# Patient Record
Sex: FEMALE | Race: WHITE | NOT HISPANIC OR LATINO | Employment: UNEMPLOYED | URBAN - METROPOLITAN AREA
[De-identification: names, ages, dates, MRNs, and addresses within clinical notes are randomized per-mention and may not be internally consistent; named-entity substitution may affect disease eponyms.]

---

## 2019-07-21 ENCOUNTER — HOSPITAL ENCOUNTER (OUTPATIENT)
Facility: HOSPITAL | Age: 39
Setting detail: OBSERVATION
Discharge: HOME | End: 2019-07-22
Attending: INTERNAL MEDICINE | Admitting: INTERNAL MEDICINE

## 2019-07-21 ENCOUNTER — APPOINTMENT (OUTPATIENT)
Dept: CT IMAGING | Facility: HOSPITAL | Age: 39
End: 2019-07-21

## 2019-07-21 DIAGNOSIS — S32.058A OTHER CLOSED FRACTURE OF FIFTH LUMBAR VERTEBRA, INITIAL ENCOUNTER (HCC): ICD-10-CM
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
## 2019-07-21 DIAGNOSIS — S32.020A CLOSED WEDGE COMPRESSION FRACTURE OF SECOND LUMBAR VERTEBRA, INITIAL ENCOUNTER: Primary | ICD-10-CM

## 2019-07-21 PROBLEM — S32.009A FRACTURE OF LUMBAR SPINE (HCC): Status: ACTIVE | Noted: 2019-07-21

## 2019-07-21 LAB
ANION GAP SERPL CALCULATED.3IONS-SCNC: 14.1 MMOL/L (ref 5–15)
BASOPHILS # BLD AUTO: 0.1 10*3/MM3 (ref 0–0.2)
BASOPHILS NFR BLD AUTO: 0.6 % (ref 0–1.5)
BUN BLD-MCNC: 6 MG/DL (ref 8–20)
BUN/CREAT SERPL: 8.6 (ref 5.4–26.2)
CALCIUM SPEC-SCNC: 8.9 MG/DL (ref 8.9–10.3)
CHLORIDE SERPL-SCNC: 100 MMOL/L (ref 101–111)
CO2 SERPL-SCNC: 28 MMOL/L (ref 22–32)
CREAT BLD-MCNC: 0.7 MG/DL (ref 0.4–1)
DEPRECATED RDW RBC AUTO: 41.6 FL (ref 37–54)
EOSINOPHIL # BLD AUTO: 0.1 10*3/MM3 (ref 0–0.4)
EOSINOPHIL NFR BLD AUTO: 1.2 % (ref 0.3–6.2)
ERYTHROCYTE [DISTWIDTH] IN BLOOD BY AUTOMATED COUNT: 13.8 % (ref 12.3–15.4)
GFR SERPL CREATININE-BSD FRML MDRD: 95 ML/MIN/1.73
GLUCOSE BLD-MCNC: 214 MG/DL (ref 65–99)
HCT VFR BLD AUTO: 44.2 % (ref 34–46.6)
HGB BLD-MCNC: 14.9 G/DL (ref 12–15.9)
LYMPHOCYTES # BLD AUTO: 1.6 10*3/MM3 (ref 0.7–3.1)
LYMPHOCYTES NFR BLD AUTO: 15.9 % (ref 19.6–45.3)
MCH RBC QN AUTO: 28.9 PG (ref 26.6–33)
MCHC RBC AUTO-ENTMCNC: 33.8 G/DL (ref 31.5–35.7)
MCV RBC AUTO: 85.6 FL (ref 79–97)
MONOCYTES # BLD AUTO: 0.5 10*3/MM3 (ref 0.1–0.9)
MONOCYTES NFR BLD AUTO: 4.8 % (ref 5–12)
NEUTROPHILS # BLD AUTO: 8 10*3/MM3 (ref 1.7–7)
NEUTROPHILS NFR BLD AUTO: 77.5 % (ref 42.7–76)
NRBC BLD AUTO-RTO: 0 /100 WBC (ref 0–0.2)
PLATELET # BLD AUTO: 333 10*3/MM3 (ref 140–450)
PMV BLD AUTO: 8.2 FL (ref 6–12)
POTASSIUM BLD-SCNC: 3.1 MMOL/L (ref 3.6–5.1)
RBC # BLD AUTO: 5.17 10*6/MM3 (ref 3.77–5.28)
SODIUM BLD-SCNC: 139 MMOL/L (ref 136–144)
WBC NRBC COR # BLD: 10.3 10*3/MM3 (ref 3.4–10.8)

## 2019-07-21 PROCEDURE — 74177 CT ABD & PELVIS W/CONTRAST: CPT

## 2019-07-21 PROCEDURE — 25010000002 ONDANSETRON PER 1 MG: Performed by: PHYSICIAN ASSISTANT

## 2019-07-21 PROCEDURE — 85025 COMPLETE CBC W/AUTO DIFF WBC: CPT | Performed by: PHYSICIAN ASSISTANT

## 2019-07-21 PROCEDURE — 63710000001 INSULIN GLARGINE PER 5 UNITS: Performed by: NURSE PRACTITIONER

## 2019-07-21 PROCEDURE — 25010000002 PROMETHAZINE PER 50 MG: Performed by: NURSE PRACTITIONER

## 2019-07-21 PROCEDURE — G0378 HOSPITAL OBSERVATION PER HR: HCPCS

## 2019-07-21 PROCEDURE — 96375 TX/PRO/DX INJ NEW DRUG ADDON: CPT

## 2019-07-21 PROCEDURE — 96374 THER/PROPH/DIAG INJ IV PUSH: CPT

## 2019-07-21 PROCEDURE — 83036 HEMOGLOBIN GLYCOSYLATED A1C: CPT | Performed by: NURSE PRACTITIONER

## 2019-07-21 PROCEDURE — 25010000002 KETOROLAC TROMETHAMINE PER 15 MG: Performed by: PHYSICIAN ASSISTANT

## 2019-07-21 PROCEDURE — 0 IOPAMIDOL PER 1 ML: Performed by: PHYSICIAN ASSISTANT

## 2019-07-21 PROCEDURE — 25010000002 MORPHINE PER 10 MG: Performed by: EMERGENCY MEDICINE

## 2019-07-21 PROCEDURE — 99284 EMERGENCY DEPT VISIT MOD MDM: CPT

## 2019-07-21 PROCEDURE — 99219 PR INITIAL OBSERVATION CARE/DAY 50 MINUTES: CPT | Performed by: NURSE PRACTITIONER

## 2019-07-21 PROCEDURE — 80048 BASIC METABOLIC PNL TOTAL CA: CPT | Performed by: PHYSICIAN ASSISTANT

## 2019-07-21 RX ORDER — KETOROLAC TROMETHAMINE 30 MG/ML
30 INJECTION, SOLUTION INTRAMUSCULAR; INTRAVENOUS ONCE
Status: COMPLETED | OUTPATIENT
Start: 2019-07-21 | End: 2019-07-21

## 2019-07-21 RX ORDER — POTASSIUM CHLORIDE 1.5 G/1.77G
40 POWDER, FOR SOLUTION ORAL AS NEEDED
Status: DISCONTINUED | OUTPATIENT
Start: 2019-07-21 | End: 2019-07-22 | Stop reason: HOSPADM

## 2019-07-21 RX ORDER — POTASSIUM CHLORIDE 7.45 MG/ML
10 INJECTION INTRAVENOUS
Status: DISCONTINUED | OUTPATIENT
Start: 2019-07-21 | End: 2019-07-22 | Stop reason: HOSPADM

## 2019-07-21 RX ORDER — MORPHINE SULFATE 4 MG/ML
4 INJECTION, SOLUTION INTRAMUSCULAR; INTRAVENOUS ONCE
Status: COMPLETED | OUTPATIENT
Start: 2019-07-21 | End: 2019-07-21

## 2019-07-21 RX ORDER — HYDROCODONE BITARTRATE AND ACETAMINOPHEN 5; 325 MG/1; MG/1
1 TABLET ORAL EVERY 4 HOURS PRN
Status: DISCONTINUED | OUTPATIENT
Start: 2019-07-21 | End: 2019-07-22 | Stop reason: HOSPADM

## 2019-07-21 RX ORDER — SODIUM CHLORIDE 0.9 % (FLUSH) 0.9 %
10 SYRINGE (ML) INJECTION AS NEEDED
Status: DISCONTINUED | OUTPATIENT
Start: 2019-07-21 | End: 2019-07-22 | Stop reason: HOSPADM

## 2019-07-21 RX ORDER — LIDOCAINE 50 MG/G
2 PATCH TOPICAL ONCE
Status: COMPLETED | OUTPATIENT
Start: 2019-07-21 | End: 2019-07-22

## 2019-07-21 RX ORDER — ACETAMINOPHEN AND CODEINE PHOSPHATE 300; 30 MG/1; MG/1
1 TABLET ORAL EVERY 4 HOURS PRN
COMMUNITY
End: 2019-07-21

## 2019-07-21 RX ORDER — ONDANSETRON 2 MG/ML
4 INJECTION INTRAMUSCULAR; INTRAVENOUS ONCE
Status: COMPLETED | OUTPATIENT
Start: 2019-07-21 | End: 2019-07-21

## 2019-07-21 RX ORDER — INSULIN GLARGINE 100 [IU]/ML
90 INJECTION, SOLUTION SUBCUTANEOUS NIGHTLY
Status: DISCONTINUED | OUTPATIENT
Start: 2019-07-21 | End: 2019-07-22 | Stop reason: HOSPADM

## 2019-07-21 RX ORDER — POTASSIUM CHLORIDE 20 MEQ/1
40 TABLET, EXTENDED RELEASE ORAL AS NEEDED
Status: DISCONTINUED | OUTPATIENT
Start: 2019-07-21 | End: 2019-07-22 | Stop reason: HOSPADM

## 2019-07-21 RX ADMIN — KETOROLAC TROMETHAMINE 30 MG: 30 INJECTION, SOLUTION INTRAMUSCULAR at 20:11

## 2019-07-21 RX ADMIN — LIDOCAINE 2 PATCH: 50 PATCH CUTANEOUS at 20:15

## 2019-07-21 RX ADMIN — HYDROCODONE BITARTRATE AND ACETAMINOPHEN 1 TABLET: 5; 325 TABLET ORAL at 23:28

## 2019-07-21 RX ADMIN — MORPHINE SULFATE 4 MG: 4 INJECTION INTRAVENOUS at 19:19

## 2019-07-21 RX ADMIN — PROMETHAZINE HYDROCHLORIDE 12.5 MG: 25 INJECTION INTRAMUSCULAR; INTRAVENOUS at 23:29

## 2019-07-21 RX ADMIN — INSULIN GLARGINE 90 UNITS: 100 INJECTION, SOLUTION SUBCUTANEOUS at 23:07

## 2019-07-21 RX ADMIN — IOPAMIDOL 100 ML: 755 INJECTION, SOLUTION INTRAVENOUS at 19:57

## 2019-07-21 RX ADMIN — ONDANSETRON 4 MG: 2 INJECTION INTRAMUSCULAR; INTRAVENOUS at 19:19

## 2019-07-22 ENCOUNTER — APPOINTMENT (OUTPATIENT)
Dept: CT IMAGING | Facility: HOSPITAL | Age: 39
End: 2019-07-22

## 2019-07-22 ENCOUNTER — APPOINTMENT (OUTPATIENT)
Dept: GENERAL RADIOLOGY | Facility: HOSPITAL | Age: 39
End: 2019-07-22

## 2019-07-22 ENCOUNTER — APPOINTMENT (OUTPATIENT)
Dept: MRI IMAGING | Facility: HOSPITAL | Age: 39
End: 2019-07-22

## 2019-07-22 VITALS
OXYGEN SATURATION: 98 % | WEIGHT: 159.17 LBS | HEIGHT: 62 IN | RESPIRATION RATE: 15 BRPM | TEMPERATURE: 98.5 F | DIASTOLIC BLOOD PRESSURE: 82 MMHG | BODY MASS INDEX: 29.29 KG/M2 | HEART RATE: 72 BPM | SYSTOLIC BLOOD PRESSURE: 136 MMHG

## 2019-07-22 LAB
B-HCG UR QL: NEGATIVE
BACTERIA UR QL AUTO: ABNORMAL /HPF
BILIRUB UR QL STRIP: NEGATIVE
CLARITY UR: ABNORMAL
COLOR UR: YELLOW
GLUCOSE BLDC GLUCOMTR-MCNC: 132 MG/DL (ref 70–105)
GLUCOSE BLDC GLUCOMTR-MCNC: 39 MG/DL (ref 70–105)
GLUCOSE BLDC GLUCOMTR-MCNC: 51 MG/DL (ref 70–105)
GLUCOSE BLDC GLUCOMTR-MCNC: 65 MG/DL (ref 70–105)
GLUCOSE BLDC GLUCOMTR-MCNC: 76 MG/DL (ref 70–105)
GLUCOSE UR STRIP-MCNC: NEGATIVE MG/DL
HBA1C MFR BLD: 9 % (ref 3.5–5.6)
HGB UR QL STRIP.AUTO: NEGATIVE
HYALINE CASTS UR QL AUTO: ABNORMAL /LPF
KETONES UR QL STRIP: NEGATIVE
LEUKOCYTE ESTERASE UR QL STRIP.AUTO: ABNORMAL
NITRITE UR QL STRIP: POSITIVE
PH UR STRIP.AUTO: 6 [PH] (ref 5–8)
PROT UR QL STRIP: NEGATIVE
RBC # UR: ABNORMAL /HPF
REF LAB TEST METHOD: ABNORMAL
SP GR UR STRIP: 1.02 (ref 1–1.03)
SQUAMOUS #/AREA URNS HPF: ABNORMAL /HPF
UROBILINOGEN UR QL STRIP: ABNORMAL
WBC UR QL AUTO: ABNORMAL /HPF

## 2019-07-22 PROCEDURE — 96361 HYDRATE IV INFUSION ADD-ON: CPT

## 2019-07-22 PROCEDURE — 96376 TX/PRO/DX INJ SAME DRUG ADON: CPT

## 2019-07-22 PROCEDURE — 25010000002 MORPHINE PER 10 MG: Performed by: NURSE PRACTITIONER

## 2019-07-22 PROCEDURE — 99217 PR OBSERVATION CARE DISCHARGE MANAGEMENT: CPT | Performed by: INTERNAL MEDICINE

## 2019-07-22 PROCEDURE — 82962 GLUCOSE BLOOD TEST: CPT

## 2019-07-22 PROCEDURE — 81025 URINE PREGNANCY TEST: CPT | Performed by: NURSE PRACTITIONER

## 2019-07-22 PROCEDURE — 87088 URINE BACTERIA CULTURE: CPT | Performed by: NURSE PRACTITIONER

## 2019-07-22 PROCEDURE — 87081 CULTURE SCREEN ONLY: CPT | Performed by: ORTHOPAEDIC SURGERY

## 2019-07-22 PROCEDURE — 87086 URINE CULTURE/COLONY COUNT: CPT | Performed by: NURSE PRACTITIONER

## 2019-07-22 PROCEDURE — 81001 URINALYSIS AUTO W/SCOPE: CPT | Performed by: NURSE PRACTITIONER

## 2019-07-22 PROCEDURE — 70450 CT HEAD/BRAIN W/O DYE: CPT

## 2019-07-22 PROCEDURE — 72020 X-RAY EXAM OF SPINE 1 VIEW: CPT

## 2019-07-22 PROCEDURE — 87186 SC STD MICRODIL/AGAR DIL: CPT | Performed by: NURSE PRACTITIONER

## 2019-07-22 PROCEDURE — 99244 OFF/OP CNSLTJ NEW/EST MOD 40: CPT | Performed by: ORTHOPAEDIC SURGERY

## 2019-07-22 PROCEDURE — G0378 HOSPITAL OBSERVATION PER HR: HCPCS

## 2019-07-22 PROCEDURE — 72148 MRI LUMBAR SPINE W/O DYE: CPT

## 2019-07-22 PROCEDURE — 97161 PT EVAL LOW COMPLEX 20 MIN: CPT

## 2019-07-22 RX ORDER — KETOROLAC TROMETHAMINE 30 MG/ML
30 INJECTION, SOLUTION INTRAMUSCULAR; INTRAVENOUS EVERY 6 HOURS PRN
Status: DISCONTINUED | OUTPATIENT
Start: 2019-07-22 | End: 2019-07-22 | Stop reason: HOSPADM

## 2019-07-22 RX ORDER — MORPHINE SULFATE 4 MG/ML
4 INJECTION, SOLUTION INTRAMUSCULAR; INTRAVENOUS EVERY 4 HOURS PRN
Status: DISCONTINUED | OUTPATIENT
Start: 2019-07-22 | End: 2019-07-22 | Stop reason: HOSPADM

## 2019-07-22 RX ORDER — NICOTINE POLACRILEX 4 MG
15 LOZENGE BUCCAL
Status: DISCONTINUED | OUTPATIENT
Start: 2019-07-22 | End: 2019-07-22 | Stop reason: HOSPADM

## 2019-07-22 RX ORDER — TRAMADOL HYDROCHLORIDE 50 MG/1
50 TABLET ORAL EVERY 6 HOURS PRN
Status: DISCONTINUED | OUTPATIENT
Start: 2019-07-22 | End: 2019-07-22 | Stop reason: HOSPADM

## 2019-07-22 RX ORDER — SODIUM CHLORIDE 0.9 % (FLUSH) 0.9 %
3 SYRINGE (ML) INJECTION EVERY 12 HOURS SCHEDULED
Status: DISCONTINUED | OUTPATIENT
Start: 2019-07-22 | End: 2019-07-22 | Stop reason: HOSPADM

## 2019-07-22 RX ORDER — HYDROCODONE BITARTRATE AND ACETAMINOPHEN 7.5; 325 MG/1; MG/1
1 TABLET ORAL EVERY 4 HOURS PRN
Qty: 30 TABLET | Refills: 0 | Status: SHIPPED | OUTPATIENT
Start: 2019-07-22 | End: 2019-08-09

## 2019-07-22 RX ORDER — SODIUM CHLORIDE 9 MG/ML
100 INJECTION, SOLUTION INTRAVENOUS CONTINUOUS
Status: DISCONTINUED | OUTPATIENT
Start: 2019-07-22 | End: 2019-07-22 | Stop reason: HOSPADM

## 2019-07-22 RX ORDER — NICOTINE 21 MG/24HR
1 PATCH, TRANSDERMAL 24 HOURS TRANSDERMAL
Status: DISCONTINUED | OUTPATIENT
Start: 2019-07-22 | End: 2019-07-22 | Stop reason: HOSPADM

## 2019-07-22 RX ORDER — HYDROCODONE BITARTRATE AND ACETAMINOPHEN 7.5; 325 MG/1; MG/1
1 TABLET ORAL EVERY 6 HOURS PRN
Qty: 30 TABLET | Refills: 0 | Status: SHIPPED | OUTPATIENT
Start: 2019-07-22 | End: 2019-08-09

## 2019-07-22 RX ORDER — CYCLOBENZAPRINE HCL 10 MG
10 TABLET ORAL 3 TIMES DAILY PRN
Qty: 30 TABLET | Refills: 0 | Status: SHIPPED | OUTPATIENT
Start: 2019-07-22

## 2019-07-22 RX ORDER — DEXTROSE MONOHYDRATE 25 G/50ML
25 INJECTION, SOLUTION INTRAVENOUS
Status: DISCONTINUED | OUTPATIENT
Start: 2019-07-22 | End: 2019-07-22 | Stop reason: HOSPADM

## 2019-07-22 RX ORDER — BACLOFEN 10 MG/1
10 TABLET ORAL EVERY 12 HOURS SCHEDULED
Status: DISCONTINUED | OUTPATIENT
Start: 2019-07-22 | End: 2019-07-22 | Stop reason: HOSPADM

## 2019-07-22 RX ORDER — IBUPROFEN 600 MG/1
600 TABLET ORAL EVERY 6 HOURS PRN
Start: 2019-07-22

## 2019-07-22 RX ORDER — SODIUM CHLORIDE 0.9 % (FLUSH) 0.9 %
3-10 SYRINGE (ML) INJECTION AS NEEDED
Status: DISCONTINUED | OUTPATIENT
Start: 2019-07-22 | End: 2019-07-22 | Stop reason: HOSPADM

## 2019-07-22 RX ADMIN — SODIUM CHLORIDE 100 ML/HR: 900 INJECTION, SOLUTION INTRAVENOUS at 11:21

## 2019-07-22 RX ADMIN — POTASSIUM CHLORIDE 40 MEQ: 1500 TABLET, EXTENDED RELEASE ORAL at 06:07

## 2019-07-22 RX ADMIN — POTASSIUM CHLORIDE 40 MEQ: 1500 TABLET, EXTENDED RELEASE ORAL at 11:18

## 2019-07-22 RX ADMIN — SODIUM CHLORIDE, PRESERVATIVE FREE 3 ML: 5 INJECTION INTRAVENOUS at 11:21

## 2019-07-22 RX ADMIN — MORPHINE SULFATE 4 MG: 4 INJECTION INTRAVENOUS at 07:57

## 2019-07-22 RX ADMIN — HYDROCODONE BITARTRATE AND ACETAMINOPHEN 1 TABLET: 5; 325 TABLET ORAL at 11:18

## 2019-07-22 RX ADMIN — NICOTINE 1 PATCH: 21 PATCH TRANSDERMAL at 11:19

## 2019-07-22 RX ADMIN — HYDROCODONE BITARTRATE AND ACETAMINOPHEN 1 TABLET: 5; 325 TABLET ORAL at 15:31

## 2019-07-22 RX ADMIN — BACLOFEN 10 MG: 10 TABLET ORAL at 15:31

## 2019-07-22 RX ADMIN — SODIUM CHLORIDE 100 ML/HR: 900 INJECTION, SOLUTION INTRAVENOUS at 07:56

## 2019-07-23 ENCOUNTER — READMISSION MANAGEMENT (OUTPATIENT)
Dept: CALL CENTER | Facility: HOSPITAL | Age: 39
End: 2019-07-23

## 2019-07-23 LAB — MRSA SPEC QL CULT: NORMAL

## 2019-07-24 ENCOUNTER — READMISSION MANAGEMENT (OUTPATIENT)
Dept: CALL CENTER | Facility: HOSPITAL | Age: 39
End: 2019-07-24

## 2019-07-24 LAB — BACTERIA SPEC AEROBE CULT: ABNORMAL

## 2019-07-24 NOTE — OUTREACH NOTE
Prep Survey      Responses   Facility patient discharged from?  [facility]   Is patient eligible?  Yes   Discharge diagnosis  Fracture of lumbar spine ([facility] TLSO bracing)   Does the patient have one of the following disease processes/diagnoses(primary or secondary)?  Other   Does the patient have Home health ordered?  No   Is there a DME ordered?  No   Is this a private patient?  Yes   Prep survey completed?  Yes          Mildred Lacey RN

## 2019-07-24 NOTE — OUTREACH NOTE
Medical Week 1 Survey      Responses   Facility patient discharged from?  [facility]   Does the patient have one of the following disease processes/diagnoses(primary or secondary)?  Other   Is there a successful TCM telephone encounter documented?  No   Week 1 attempt successful?  No   Rescheduled  Revoked   Revoke  Phone number issues [voicemail of person was named Flavio.  No voicemail left]          Leyla Pollock LPN

## 2019-07-28 ENCOUNTER — TELEPHONE (OUTPATIENT)
Dept: INTERNAL MEDICINE | Facility: HOSPITAL | Age: 39
End: 2019-07-28

## 2019-08-09 ENCOUNTER — TELEPHONE (OUTPATIENT)
Dept: ORTHOPEDIC SURGERY | Facility: CLINIC | Age: 39
End: 2019-08-09

## 2019-08-09 ENCOUNTER — OFFICE VISIT (OUTPATIENT)
Dept: ORTHOPEDIC SURGERY | Facility: CLINIC | Age: 39
End: 2019-08-09

## 2019-08-09 VITALS
DIASTOLIC BLOOD PRESSURE: 85 MMHG | BODY MASS INDEX: 28.16 KG/M2 | HEIGHT: 62 IN | WEIGHT: 153 LBS | HEART RATE: 103 BPM | SYSTOLIC BLOOD PRESSURE: 127 MMHG

## 2019-08-09 DIAGNOSIS — S32.9XXA CLOSED NONDISPLACED FRACTURE OF PELVIS, UNSPECIFIED PART OF PELVIS, INITIAL ENCOUNTER (HCC): ICD-10-CM

## 2019-08-09 DIAGNOSIS — M41.125 ADOLESCENT IDIOPATHIC SCOLIOSIS OF THORACOLUMBAR REGION: ICD-10-CM

## 2019-08-09 DIAGNOSIS — M54.5 ACUTE LOW BACK PAIN, UNSPECIFIED BACK PAIN LATERALITY, WITH SCIATICA PRESENCE UNSPECIFIED: Primary | ICD-10-CM

## 2019-08-09 DIAGNOSIS — M47.27 LUMBOSACRAL RADICULOPATHY DUE TO DEGENERATIVE JOINT DISEASE OF SPINE: ICD-10-CM

## 2019-08-09 DIAGNOSIS — S32.020A CLOSED COMPRESSION FRACTURE OF SECOND LUMBAR VERTEBRA, INITIAL ENCOUNTER: ICD-10-CM

## 2019-08-09 PROBLEM — M54.50 ACUTE LOW BACK PAIN: Status: ACTIVE | Noted: 2019-08-09

## 2019-08-09 PROCEDURE — 99203 OFFICE O/P NEW LOW 30 MIN: CPT | Performed by: PHYSICIAN ASSISTANT

## 2019-08-09 RX ORDER — METHYLPREDNISOLONE 4 MG/1
21 TABLET ORAL DAILY
Qty: 21 TABLET | Refills: 0 | Status: SHIPPED | OUTPATIENT
Start: 2019-08-09

## 2019-08-09 RX ORDER — OXYCODONE HYDROCHLORIDE AND ACETAMINOPHEN 5; 325 MG/1; MG/1
1 TABLET ORAL EVERY 4 HOURS PRN
Qty: 30 TABLET | Refills: 0 | Status: SHIPPED | OUTPATIENT
Start: 2019-08-09

## 2019-08-09 RX ORDER — ONDANSETRON 4 MG/1
4 TABLET, FILM COATED ORAL EVERY 8 HOURS PRN
Qty: 30 TABLET | Refills: 0 | Status: SHIPPED | OUTPATIENT
Start: 2019-08-09

## 2019-08-09 RX ORDER — BACLOFEN 10 MG/1
10 TABLET ORAL 3 TIMES DAILY
Qty: 60 TABLET | Refills: 0 | Status: SHIPPED | OUTPATIENT
Start: 2019-08-09

## 2019-08-09 RX ORDER — OXYCODONE HYDROCHLORIDE AND ACETAMINOPHEN 5; 325 MG/1; MG/1
1 TABLET ORAL EVERY 4 HOURS PRN
Qty: 30 TABLET | Refills: 0 | Status: SHIPPED | OUTPATIENT
Start: 2019-08-09 | End: 2019-08-09 | Stop reason: SDUPTHER

## 2019-08-09 RX ORDER — GABAPENTIN 300 MG/1
CAPSULE ORAL
Qty: 60 CAPSULE | Refills: 0 | Status: SHIPPED | OUTPATIENT
Start: 2019-08-09

## 2019-08-09 NOTE — PROGRESS NOTES
[facility] New patient    Patient Name: Jewels Douglas    History of Present Illness:  Jewels Douglas is a 39 y.o. year old female here today with chief complaint of had concerns including Follow-up and Pain of the Lumbar Spine and Consult (NP ER follow up on comp fx)..  This patient drove up from [place] today to be seen for a L2 compression fracture.  She was thrown off a horse Apt. 2 weeks ago in this area and was rushed to the emergency department she was diagnosed at L2 fracture and that her brace and sent home.  Also given prescription for Norco and states to help.  Her axial back pain is actually improved significantly since that ER visit, but she continues to have severe bilateral buttock pain right worse than left.  No radiation further down her leg.  Pain is worse with sitting unremarkable ambulating.  Is never had any similar problems.  Pain is intractable.    Imaging: Reviewed MRI of lumbar spine which demonstrates acute fracture of L2 right side anterior wedging of vertebral body of L2, no bony retropulsion no disruption posterior elements at that level.  She has some levoscoliosis which appears idiopathic.  I see no evidence of pelvic fracture but I can see her superior aspect of the sacral ala.  Also reviewed a CT abdomen pelvis which shows the L2 compression, no displaced fracture of pelvis. she has previously L5-S1 disc degeneration with a small 5 S1 disc herniation without stenosis.    Impression:   1. Acute low back pain, unspecified back pain laterality, with sciatica presence unspecified    2. Closed compression fracture of second lumbar vertebra, initial encounter (CMS/[facility])    3. Lumbosacral radiculopathy due to degenerative joint disease of spine    4. Adolescent idiopathic scoliosis of thoracolumbar region    5. Closed nondisplaced fracture of pelvis, unspecified part of pelvis, initial encounter (CMS/[facility])         Plan: Continue to wear TLSO.  Because she is from over an hour  away from here my plan is to refer her to a specialist in her area, Dr. Saenz.  My concern is for pelvic fracture versus sacral iliac dysfunction at this time peer I will also order MRI of pelvis.  I advised that she take all of her images to her appointment with dr saenz.   Neurontin, Atrovent, Medrol, oxycodone for pain control.    Vitals:  Vitals:    19 0938   BP: 127/85   Pulse: 103       Patient's Family and Social History:  History reviewed. No pertinent family history.  Social History     Socioeconomic History   • Marital status:      Spouse name: Not on file   • Number of children: Not on file   • Years of education: Not on file   • Highest education level: Not on file   Tobacco Use   • Smoking status: Current Every Day Smoker     Packs/day: 1.00     Types: Cigarettes   • Smokeless tobacco: Never Used   Substance and Sexual Activity   • Alcohol use: Yes     Comment: occasional   • Drug use: No   • Sexual activity: Defer       Patients Medical and Surgical History:  Past Medical History:   Diagnosis Date   • Diabetes mellitus (CMS/HCC)    • Disease of thyroid gland    • Scoliosis      Past Surgical History:   Procedure Laterality Date   •  SECTION     • CHOLECYSTECTOMY         Review of Systems   Constitutional: Negative for activity change, appetite change and fatigue.   HENT: Negative for congestion.    Eyes: Negative for visual disturbance.   Respiratory: Negative for shortness of breath.    Gastrointestinal: Negative for abdominal pain.   Genitourinary: Negative for flank pain.   Musculoskeletal: Positive for arthralgias and back pain.   Skin: Negative for wound.   Neurological: Negative for headaches.   Psychiatric/Behavioral: Negative for behavioral problems. The patient is not nervous/anxious.        Physical Exam   Constitutional: She is oriented to person, place, and time. She appears well-developed.   HENT:   Head: Normocephalic and atraumatic.   Eyes: Conjunctivae are normal.    Neck: Normal range of motion.   Cardiovascular: Normal rate.   Pulmonary/Chest: Effort normal.   Abdominal: There is no guarding.   Musculoskeletal: She exhibits tenderness.        Right hip: Normal.        Left hip: Normal.        Lumbar back: She exhibits decreased range of motion, tenderness and pain.        Arms:  Neurological: She is oriented to person, place, and time.   Skin: Skin is warm.   Psychiatric: Her behavior is normal.   Vitals reviewed.    Back Exam     Tenderness   The patient is experiencing tenderness in the sacroiliac.    Comments:  Severe pain with compression of pelvis, severe pain with palpation over both sacral iliac joint.            Orders Placed This Encounter   Procedures   • XR Spine Lumbar 1 View     Scheduling Instructions:      rm 19      LSpine lat      Np - LBP following comp fx L2 (fall from horse) 2 weeks ago      9:45     Order Specific Question:   Reason for Exam:     Answer:   low back pain     Order Specific Question:   Patient Pregnant     Answer:   No   • MRI Pelvis Without Contrast     Standing Status:   Future     Standing Expiration Date:   8/9/2020     Scheduling Instructions:      [facility]     Order Specific Question:   Patient Pregnant     Answer:   Unknown   • Ambulatory Referral to Neurosurgery     Referral Priority:   Routine     Referral Type:   Consultation     Referral Reason:   Specialty Services Required     Referred to Provider:   Ruiz Saenz MD     Requested Specialty:   Neurosurgery     Number of Visits Requested:   1

## 2021-02-23 ENCOUNTER — HOSPITAL ENCOUNTER (OUTPATIENT)
Dept: DIABETES SERVICES | Facility: HOSPITAL | Age: 41
Discharge: HOME | End: 2021-02-23
Attending: NURSE PRACTITIONER

## 2021-02-23 ENCOUNTER — OFFICE VISIT CONVERTED (OUTPATIENT)
Dept: DIABETES SERVICES | Facility: HOSPITAL | Age: 41
End: 2021-02-23
Attending: NURSE PRACTITIONER

## 2021-02-23 LAB — GLUCOSE BLD-MCNC: 353 MG/DL (ref 65–99)

## 2021-04-02 ENCOUNTER — HOSPITAL ENCOUNTER (OUTPATIENT)
Dept: DIABETES SERVICES | Facility: HOSPITAL | Age: 41
Discharge: HOME | End: 2021-04-02
Attending: NURSE PRACTITIONER

## 2021-04-02 ENCOUNTER — OFFICE VISIT CONVERTED (OUTPATIENT)
Dept: DIABETES SERVICES | Facility: HOSPITAL | Age: 41
End: 2021-04-02
Attending: NURSE PRACTITIONER

## 2021-04-02 LAB — GLUCOSE BLD-MCNC: 419 MG/DL (ref 65–99)

## 2021-04-13 ENCOUNTER — OFFICE VISIT CONVERTED (OUTPATIENT)
Dept: GASTROENTEROLOGY | Facility: CLINIC | Age: 41
End: 2021-04-13
Attending: NURSE PRACTITIONER

## 2021-04-13 ENCOUNTER — CONVERSION ENCOUNTER (OUTPATIENT)
Dept: GASTROENTEROLOGY | Facility: CLINIC | Age: 41
End: 2021-04-13

## 2021-05-11 NOTE — H&P
History and Physical      Patient Name: Jewels Douglas   Patient ID: [id number]   Sex: Female   YOB: 1980    Primary Care Provider: Nikole RAMOS   Referring Provider: Nikole RAMOS    Visit Date: April 13, 2021    Provider: RICHARD Macias   Location: [facility]   Location Address: [address] [place] [address], [address]  [place], [place]  [id number]   Location Phone: [phone]          Chief Complaint  · Nausea  · Vomiting      History Of Present Illness  The patient is a 40 year old female who presents on referral from Nikole RAMOS for a gastroenterology evaluation.      She presents for evaluation of nausea and vomiting.      Vomiting is occurring 2-3 times per week.  States that she's now eating very tiny portions.  If she overeats, will have vomiting.  States that she feels like she can't eat because she becomes full quickly.  This has been present for about 2 months.  Admits constant heartburn.  States that she often can't lie down due to reflux.  Tried TUMS, MOM, pepto, Nexium and Prilosec with no improvement.      Denies previous EGD.      She admits constipation and has a bowel movement about once per week.      PMH:  IDDM since pregnancy in 2006.      40-50 lb. weight loss in the past 6 months.  She is gaining some weight back b/c she's figured out what she can eat.            Medication List  Basaglar KwikPen U-100 Insulin 100 unit/mL (3 mL) subcutaneous insulin pen; Humalog KwikPen Insulin 100 unit/mL subcutaneous insulin pen         Allergy List  NO KNOWN DRUG ALLERGIES       Allergies Reconciled  Family Medical History  Colon Cancer         Social History  Alcohol (Current - status unknown); Tobacco (Current every day)         Review of Systems  · Constitutional  o Denies  o : chills, fever  · Eyes  o Denies  o : blurred vision, changes in vision  · Cardiovascular  o Denies  o : chest pain, irregular heart beats  · Respiratory  o Denies  o :  "shortness of breath, cough  · Gastrointestinal  o Admits  o : See HPI  · Genitourinary  o Denies  o : dysuria, blood in urine  · Integument  o Denies  o : rash, new skin lesions  · Neurologic  o Denies  o : tingling or numbness, seizures  · Musculoskeletal  o Denies  o : joint pain, joint swelling  · Endocrine  o Admits  o : weight loss  o Denies  o : weight gain  · Psychiatric  o Denies  o : anxiety, depression      Vitals  Date Time BP Position Site L\R Cuff Size HR RR TEMP (F) WT  HT  BMI kg/m2 BSA m2 O2 Sat FR L/min FiO2 HC       04/13/2021 03:04 /76 Sitting    79 - R  97.3 149lbs 2oz 5'  6\" 24.07 1.77             Physical Examination  · Constitutional  o Appearance  o : well developed, well-nourished, in no acute distress  · Eyes  o Vision  o :   § Visual Fields  § : eyes move symmetrical in all directions  o Sclerae  o : anicteric  o Pupils and Irises  o : pupils equal and symmetrical  · Neck  o Inspection/Palpation  o : supple  · Respiratory  o Respiratory Effort  o : breathing unlabored  o Inspection of Chest  o : normal appearance, no retractions  o Auscultation of Lungs  o : clear to auscultation bilaterally  · Cardiovascular  o Heart  o :   § Auscultation of Heart  § : no murmurs, gallops or rubs  · Gastrointestinal  o Abdominal Examination  o : soft, nontender to palpation, with normal active bowel sounds, no appreciable hepatosplenomegaly  o Digital Rectal Exam  o : deferred  · Lymphatic  o Neck  o : no palpable lymphadenopathy  · Skin and Subcutaneous Tissue  o General Inspection  o : without focal lesions; turgor is normal  · Psychiatric  o General  o : Alert and oriented x3  o Mood and Affect  o : Mood and affect are appropriate to circumstances  · Extremities  o Extremities  o : No edema, no cyanosis          Assessment  · Pre-op testing     V72.84/Z01.818  · Abdominal bloating     787.3/R14.0  · Constipation     564.00/K59.00  · GERD (gastroesophageal reflux " disease)     530.81/K21.9  · Heartburn     787.1/R12  · Nausea and vomiting     787.01/R11.2  · Weight loss     783.21/R63.4      Plan  · Orders  o [facility] Pre-Op Covid-19 Screening (20081) - - 2021  o Consent for Esophagogastroduodenoscopy (EGD) - Possible risks/complications, benefits, and alternatives to surgical or invasive procedure have been explained to patient and/or legal guardian. - Patient has been evaluated and can tolerate anesthesia and/or sedation. Risks, benefits, and alternatives to anesthesia and sedation have been explained to patient and/or legal guardian. (96643) - - 2021  · Medications  o Miralax 17 gram/dose oral powder   SI capful mixed with 8 oz liquid by mouth daily   DISP: (1) Canister with 5 refills  Prescribed on 2021     o Protonix 40 mg oral tablet,delayed release (DR/EC)   SIG: take 1 tablet (40 mg) by oral route once daily for 30 days   DISP: (30) Tablet with 3 refills  Prescribed on 2021     o Medications have been Reconciled  o Transition of Care or Provider Policy  · Instructions  o Handouts provided: Pre-procedure instructions including date and time and location of procedure.  o PLAN: Proceed with procedure. Patient understands risks and benefits and is willing to proceed. Understands the risks include, but are not limited to, bleeding and/or perforation.  o Information given on current diagnoses. If EGD normal, will need GES. Instructed to follow gastroparesis diet for symptom relief - handout given.  o Electronically Identified Patient Education Materials Provided Electronically  · Disposition  o Follow Up after procedure            Electronically Signed by: RICHARD Macias -Author on 2021 04:05:43 PM

## 2021-05-14 VITALS
DIASTOLIC BLOOD PRESSURE: 76 MMHG | BODY MASS INDEX: 23.97 KG/M2 | WEIGHT: 149.12 LBS | HEART RATE: 79 BPM | HEIGHT: 66 IN | TEMPERATURE: 97.3 F | SYSTOLIC BLOOD PRESSURE: 115 MMHG

## 2021-05-22 ENCOUNTER — TRANSCRIBE ORDERS (OUTPATIENT)
Dept: LAB | Facility: HOSPITAL | Age: 41
End: 2021-05-22

## 2021-05-22 DIAGNOSIS — Z01.818 PRE-OP TESTING: Primary | ICD-10-CM

## 2021-05-28 VITALS
DIASTOLIC BLOOD PRESSURE: 70 MMHG | OXYGEN SATURATION: 96 % | SYSTOLIC BLOOD PRESSURE: 130 MMHG | RESPIRATION RATE: 18 BRPM | WEIGHT: 148.15 LBS | TEMPERATURE: 98.9 F | BODY MASS INDEX: 27.26 KG/M2 | DIASTOLIC BLOOD PRESSURE: 60 MMHG | SYSTOLIC BLOOD PRESSURE: 110 MMHG | RESPIRATION RATE: 16 BRPM | HEIGHT: 62 IN | BODY MASS INDEX: 28.03 KG/M2 | TEMPERATURE: 98.3 F | HEART RATE: 86 BPM | HEIGHT: 62 IN | HEART RATE: 79 BPM | OXYGEN SATURATION: 95 % | WEIGHT: 152.34 LBS

## 2021-05-28 NOTE — PROGRESS NOTES
Patient: EDUIN LEDESMA     Acct: [id number]     Report: #[id number]  UNIT #: [id number]     : 1980    Encounter Date:2021  PRIMARY CARE: ROSELINE JACOBO  ***Elvira***  --------------------------------------------------------------------------------------------------------------------  Date of Encounter      2021            Chief Complaint      DIABETES MELLITUS TYPE I            Referring Providers/Copies To      Referring Provider:  ROSELINE JACOBO      Copies To:   ROSELINE JACOBO            Allergies      Coded Allergies:             No Known Drug Allergies (Verified  Allergy, 21)            Medications            medroxyPROGESTERone ACETATE (Depo-Provera) 150 Mg/1 Ml Vial      150 MG IM ONCE, #1 ML         Reported         21       Insulin Lispro (HumaLOG KWIKPEN 100 UNITS/ML) 100 Units/Ml Insuln.pen      20 UNITS SUBQ QID INSULIN, #1 BOX 0 Refills         Reported         21       Insulin Glargine,Hum.rec.anlog (Basaglar Kwikpen U-100) 100 Unit/1 Ml Insuln.pen      40 UNITS SUBQ QDAY, #1 INSULN.PEN         Reported         21            Vital Signs      Height 5 ft 2 in / 157.48 cm      Weight 148 lbs 2.386 oz / 67.2 kg      BSA 1.68 m2      BMI 27.1 kg/m2      Temperature 98.9 F / 37.17 C - Temporal      Pulse 79      Respirations 18      Blood Pressure 110/70 Sitting, Left Arm      Pulse Oximetry 95%, room air      Blood Glucose Value:  353 (Finger Stick)            Eye Exam      When was your last eye exam?:              Where was it done?:        unknown            Pain Score      Experiencing any pain?:  No            Preventative      Hx Influenza Vaccination:  No      Influenza Vaccine Declined:  Yes      Have You Had 2 or More Falls i:  No      Have You Had a Fall with an In:  No      Hx Pneumococcal Vaccination:  No      Encouraged to follow-up with:  PCP regarding preventative exams.      Chart initiated by:      Pearl Monte MA            HPI - Diabetes  "     This patient is seen in the office today for evaluation for diabetes medication     management and possible insulin pump therapy.  She is a 40-year-old female     patient with a 15-year history of type 1 diabetes.  She is accompanied by her     significant other.  Her diabetes is complicated by neuropathy and symptoms that     resemble gastroparesis but she has not been diagnosed at this time.  The patient    has had some frequent hospital admissions for DKA over the last 6 months.  She     denies missing doses of insulin except on a rare occasion.  She states her     glucose levels will become markedly elevated despite taking the insulin.  She     states her glucose levels are \"all over the place\".  She is currently managed     using Basaglar 40 units in the morning and she uses Humalog 20 to 40 units based    on a scale, but she is unable to give me the exact directions of her scale.  She    states she has never been seen before by endocrinology or diabetes services and     is hoping to establish care in this practice today.  Her glucose level upon     arrival to today's visit was 353 mg/dL.            The patient is very concerned about her GI distress.  She states that she is     struggling to keep food down on most occasions.  She states that she vomits     probably 7 out of 10 times that she eats.  She states that she eats small georgina    tities of food but feels like she has consumed excessive quantities.  The     symptoms do represent possible gastroparesis.  Giving her 15-year history of     type 1 diabetes and her other neuropathy symptoms this may be the case.  She     also states that she has reflux if she lies flat in the bed within a couple     hours of eating.  We discussed the patient's dietary behaviors that may be contr    ibuting to some of her GI distress, such as large meals versus small meals.  Her    gastroparesis may also be contributing to her fluctuating glucose levels as she     is " absorbing carbohydrates at varying rates.            Most Recent Lab Findings      Most Recent HGA1C      Most current A1c was collected on 2021 was 10.4%.  A copy of these     labs have been scanned into her record.      A C-peptide level was collected on 2021 and was less than 0.1 ng/mL     indicating type 1 diabetes      Diabetes Type:  Type 1      Diabetes Complications:  Gastroparesis (The patient indicates she has feelings     of excessive bloating and nausea with almost every meal even though she is     eating only small quantities of food; this may represent gastroparesis although     she has not been diagnosed), Neuropathy (She complains of some tingling in her     feet and fingertips)      Number of Years?:  15      Hospitalizations 2nd to DM?:  Yes (DKA x 3 in  and once in 2021)      Dietary Habits:  2 meals daily      Frequency:  Times per day (4-5)      Blood Glucose Range:        40 - 500; she indicates sometimes her meter just says high            PAST,FAMILY,   Past Medical History      Past Medical History:  Anxiety, Depression, GERD, Thyroid Disease (hypothyroid)      Other Medical History      Urinary Tract Infections; Yeast Infections            Past Surgical History      Past Surgical History:   Section, Cholecystectomy            Past Family History      TYPE 2 DM (Mother; Both Sets Grandparents)            Social History      Marrital Status:        Lives independently:  No      Number of Children:  3      Occupation:  Stay at home mom            Tobacco Use      Smoking status:  Current every day smoker      Smoking packs/day:  1      Smoking history:  10-25 pack years      Quit status:  Considering quitting (She indicates she would like to quit smoking    however she feels like she is too stressed at this time to consider it)            Vaping      Currently Vaping:  No            Alcohol Use      None            Substance Use      Substance  Use:  Denies Use            Review of Sytems      General:  Appetite Changes (poor appetite); No Fatigue; Weight Loss (40lbs. over    the last year or 2); No Weight Gain, No Weakness      Eyes:  Blurred Vision; No Corrective Lenses, No Vision Changes, No Vision Loss      Cardiovascular:  Chest Pain; No Palpitations, No Peripheral edema      Gastrointestinal:  Constipation (She reports episodes of constipation     alternating with diarrhea), Diarrhea, Nausea/Vomiting (She estimates that she     has vomiting with 7 out of 10 meals)      Integumentary:  No Lesions, No Rash, No Wounds      Neurologic:  No Extremity Pain; Numbness (Hands and Feet ), Tingling (Hands and     Feet), Headache, Dizziness      Psychiatric:  Anxiety, Depression, Stress      Endocrine:  Hypoglycemia (She reports episodes of hypoglycemia at least 3 times     a week sometimes as low as 40 mg/dL); No Hypo Unawareness; Nocturnal Hypo (3-4     times a week; awakens feeling very weak); No Polyuria; Polyphagia, Polydipsia      ENT:  No Hearing loss, No Sinusitis, No Difficulty swallowing      Respiratory:  No Shortness of breath, No Wheezing, No Cough      Genitourinary:  No UTI, No Vaginal yeast infections; Difficulty urinating; No     Incontinence      Musculoskeletal:  No Joint pain, No Muscle pain; Back pain            Exam      Constitutional:  Awake and Alert, Appropriately dressed/groomed; Not Acutely     Distressed      Eyes:  No Scleral Icterus; Intact EOM      Cardiovascular:  No Peripheral Edema; Normal Chest Appearance      Psychiatric:  Appropriate Affect, Intact Judgement, Oriented x 3,       ENMT:  Nose/ears normal, Normal hearing      Extremities:  No Cyanosis, No Edema; Normal temperature; No Deformity            Impression      Type 1 diabetes with diabetic neuropathy and possible gastroparesis, anxiety,     depression, GERD, hypothyroid            Diagnosis      Type 1 diabetes mellitus with hyperglycemia - E10.65            Notes       New Medications      * Glucagon (Baqsimi) 3 MG SPRAY: 3 MG NAREONE AS DIRECTED #2         Instructions: Administer for severe HYPOglycemia - see package insert.         Dx: Type 1 diabetes mellitus with hyperglycemia - E10.65      * Blood-Glucose Meter,Continuous (Dexcom G6 ) 1 EACH EACH: EACH XX USE       AS DIRECTED #1      * Blood-Glucose Sensor (Dexcom G6 Sensor) 1 EACH EACH: EACH XX USE AS DIRECTED       #3         Instructions: Change every 10 days.      * Blood-Glucose Transmitter (Dexcom G6 Transmitter) 1 EACH EACH: EACH XX USE AS       DIRECTED #1         Instructions: Change every 3 months.      New Office Procedures      * POINT OF CARE BG, Routine         Dx: Type 1 diabetes mellitus with hyperglycemia - E10.65            Plan      The patient was instructed to increase her Basaglar to 45 units each day given     the significant hyperglycemia she is currently having.  We discussed a variety     of potential causes of her hypoglycemia including over correcting with the rapid    acting insulin for meals and glucose levels.  She was instructed to reduce her     mealtime doses of insulin to the lower end of her existing scale.  She is asked     to bring a copy of that scale to her follow-up appointment.              As mentioned previously the patient is interested in insulin pump therapy.  We     will submit documents to the insurance for approval of the pump as well as a     continuous glucose sensor.  Given the patient's frequent episodes of hypogly    cemia, she is an appropriate candidate for continuous glucose sensor therapy.      In the meantime she is to monitor her glucose levels 4 times a day at a minimum     and is asked to record them for review at a follow-up appointment.  She will     contact our office upon receipt of the continuous glucose sensor or the insulin     pump for training on the devices.              She is to call our office if she continues to struggle with frequent  episodes of    hypoglycemia or glucose levels remain consistently above 300 mg/dL.  A prescri    ption for Baqsimi was sent to her pharmacy for treatment of severe hypoglycemia.     The patient and her significant other were instructed on the use of the device.            In regards to her GI distress it was recommended she eat multiple small meals     throughout the day rather than larger meals.it was also suggested she do a 7 to     14-day trial of over-the-counter Prilosec to see if some of her symptoms may be     related to reflux.  Because of the patient's persistent gastrointestinal issues     including bloating, nausea, and frequent vomiting we will make a referral to     gastroenterology for evaluation for GERD versus gastroparesis.            Pain Plan      Pain Zero Today            Patient Education      Patient Education Provided:  Yes            Topics Patient Counseled on      Topics Patient Counseled on:  Meds, Monitoring, Diet, Hypo Prevention            Tobacco Counseling      Tobacco Cessation Counseling:  for under 3 minutes            Electronically signed by AFSANEH REESE  02/24/2021 19:24       Disclaimer: Converted document may not contain table formatting or lab diagrams. Please see [employer] for the authenticated document.

## 2021-05-28 NOTE — PROGRESS NOTES
Patient: EDUIN LEDESMA     Acct: [id number]     Report: #[id number]  UNIT #: [id number]     : 1980    Encounter Date:2021  PRIMARY CARE: ROSELINE JACOBO  ***Elvira***  --------------------------------------------------------------------------------------------------------------------  Date of Encounter      2021            Chief Complaint      DIABETES MELLITUS TYPE I            Referring Providers/Copies To      Referring Provider:  ROSELINE JACOBO      Copies To:   ROSELINE JACOBO            Allergies      Coded Allergies:             NO KNOWN DRUG ALLERGIES (Verified  Allergy, Unknown, 21)            Medications      Last Reconciled on 21 8:01 pm by AFSANEH REESE      Glucagon (Baqsimi) 3 Mg Spray      3 MG NAREONE AS DIRECTED, #2 BOTTLE 3 Refills         Prov: AFSANEH REESE         21       medroxyPROGESTERone ACETATE (Depo-Provera) 150 Mg/1 Ml Vial      150 MG IM ONCE, #1 ML         Reported         21       Insulin Lispro (HumaLOG KWIKPEN 100 UNITS/ML) 100 Units/Ml Insuln.pen      20 UNITS SUBQ QID INSULIN, #1 BOX 0 Refills         Reported         21       Insulin Glargine,Hum.rec.anlog (Basaglar Kwikpen U-100) 100 Unit/1 Ml Insuln.pen      40 UNITS SUBQ QDAY, #1 INSULN.PEN         Reported         21            Vital Signs      Height 5 ft 2 in / 157.48 cm      Weight 152 lbs 5.406 oz / 69.1 kg      BSA 1.70 m2      BMI 27.9 kg/m2      Temperature 98.3 F / 36.83 C - Temporal      Pulse 86      Respirations 16      Blood Pressure 130/60 Sitting, Left Arm      Pulse Oximetry 96%, room air      Blood Glucose Value:  419 (Finger Stick)            Eye Exam      When was your last eye exam?:              Where was it done?:        unknown            Pain Score      Experiencing any pain?:  No            Preventative      Hx Influenza Vaccination:  No      Influenza Vaccine Declined:  Yes      Have You Had 2 or More Falls i:  Yes      Have You Had a Fall with  an In:  No      Hx Pneumococcal Vaccination:  No      Encouraged to follow-up with:  PCP regarding preventative exams.      Chart initiated by:      Pearl Monte MA            HPI - Diabetes      This patient is seen in the office today for follow-up evaluation for diabetes     medication management.  She is a 40-year-old female patient with a history of     type 1 diabetes complicated by gastroparesis and diabetic neuropathy.  She     denies any significant changes with these conditions.  The patient is currently     managed using Basaglar 46 units every day and Humalog 20 units with each meal     however she struggles with remembering to take this insulin and misses it     frequently.  She states she is often very busy or away from home and forgets to     take the insulin with her.  The patient was prescribed a Dexcom continuous     glucose sensor to assist in the management of her care however it is too costly     for her at this time.  She was also prescribed a tandem insulin pump however she    is still uncertain if she wants to use a pump or not.  The patient was also re    ferred to gastroenterology for evaluation for possible gastroparesis however she    was unable to make that appointment and has been rescheduled.  She states her     glucose levels have been widely fluctuating but she has not had any episodes of     hypoglycemia recently.            Her glucose level upon arrival to the appointment today was 419 mg/dL.  She     states that he recently ate lunch and she did not take any insulin for her     lunch.            Most Recent Lab Findings      Most Recent HGA1C      Her most current A1c was collected on February 5, 2020 and was 10.4% indicating     uncontrolled type 1 diabetes.      Diabetes Type:  Type 1      Diabetes Complications:  Gastroparesis (The patient indicates she has feelings     of excessive bloating and nausea with almost every meal even though she is     eating only small quantities  of food; this may represent gastroparesis although     she has not been diagnosed), Neuropathy (She complains of some tingling in her     feet and fingertips)      Number of Years?:  15      Hospitalizations 2nd to DM?:  Yes (DKA x 3 in  and once in 2021)      Dietary Habits:  2 meals daily      Frequency:  Times per day (2-3)      Blood Glucose Range:        150 - 'high' at times            PAST,FAMILY,   Past Medical History      Past Medical History:  Anxiety, Depression, GERD, Thyroid Disease (hypothyroid)      Other Medical History      Urinary Tract Infections; Yeast Infections            Past Surgical History      Past Surgical History:   Section, Cholecystectomy            Past Family History      TYPE 2 DM (Mother; Both Sets Grandparents)            Social History      Marrital Status:        Lives independently:  No      Number of Children:  3      Occupation:  Stay at home mom            Tobacco Use      Smoking status:  Current every day smoker      Smoking packs/day:  1      Smoking history:  10-25 pack years      Quit status:  Considering quitting (She indicates she would like to quit smoking    however she feels like she is too stressed at this time to consider it)            Vaping      Currently Vaping:  No            Alcohol Use      None            Substance Use      Substance Use:  Denies Use            Review of Sytems      General:  No Appetite Changes; Fatigue; No Weight Loss; Weight Gain (She has had    a 4 pound weight gain since her last appointment), Weakness      Eyes:  Blurred Vision; No Corrective Lenses, No Vision Changes, No Vision Loss      Cardiovascular:  No Chest Pain, No Palpitations, No Peripheral edema      Gastrointestinal:  No Constipation, No Diarrhea, No Nausea/Vomiting      Integumentary:  No Lesions, No Rash, No Wounds      Neurologic:  No Extremity Pain, No Numbness, No Tingling; Headache (Sometimes),     Dizziness (Sometimes)      Psychiatric:   No Anxiety, No Depression; Stress      Endocrine:  No Hypoglycemia, No Hypo Unawareness, No Nocturnal Hypo; Polyuria;     No Polyphagia, No Polydipsia      ENT:  No Hearing loss, No Sinusitis, No Difficulty swallowing      Respiratory:  No Shortness of breath, No Wheezing, No Cough      Genitourinary:  No UTI, No Vaginal yeast infections, No Difficulty urinating, No    Incontinence      Musculoskeletal:  No Joint pain, No Muscle pain; Back pain            Exam      Constitutional:  Awake and Alert, Appropriately dressed/groomed; Not Acutely     Distressed      Eyes:  No Scleral Icterus; Intact EOM; No Eyelid swelling      Cardiovascular:  No Peripheral Edema; Normal Chest Appearance      Musculoskeletal:  Steady Gait, Normal Strength, Normal ROM,       Respiratory:  No Respiratory Distress, No Cyanosis, No Accessory Muscle use      Skin:  No Blisters, No Cuts\Scrapes, No Acanthosis Nigricans, No DM Dermopathy,     No Fungus, No NLD, No Rash, No Vitiligo      Psychiatric:  Appropriate Affect, Intact Judgement, Oriented x 3,       ENMT:  Nose/ears normal, Normal hearing      Extremities:  No Cyanosis, No Edema; Normal temperature; No Deformity            Impression      Type 1 diabetes uncontrolled with possible gastroparesis, diabetic neuropathy,     anxiety, depression, GERD, hypothyroid            Diagnosis      Type 1 diabetes mellitus with hyperglycemia - E10.65            Notes      Discontinued Medications      * Blood-Glucose Meter,Continuous (Dexcom G6 ) 1 EACH EACH: EACH XX USE       AS DIRECTED #1      * Blood-Glucose Sensor (Dexcom G6 Sensor) 1 EACH EACH: EACH XX USE AS DIRECTED       #3         Instructions: Change every 10 days.      * Blood-Glucose Transmitter (Dexcom G6 Transmitter) 1 EACH EACH: EACH XX USE AS       DIRECTED #1         Instructions: Change every 3 months.      New Office Procedures      * POINT OF CARE BG, Routine         Dx: Type 1 diabetes mellitus with hyperglycemia -  E10.65            Plan      And lengthy discussion was held with the patient regarding the importance of     compliance with her medication in order to achieve glycemic control.  We     discussed the potential consequences of failure to achieve A1c goals.  At this     time the following changes were made to her treatment plan and were carefully     reviewed with the patient and her spouse.            She will continue taking Basaglar 46 units every day however if her fasting     glucose levels remain above 188 mg/dL after implementing these new plan she is     to increase to 50 units      She will take Humalog insulin using 10, 15, or 20 units based on small, medium,     or large sized meals based on carbohydrate content of the meal      She will take Humalog correcting for glucose levels greater than 125 mg/dL     starting with 2 units and increasing by 1 unit for every 25 mg/dL thereafter            She is to monitor her glucose levels 4 times a day and record them for review at    her follow-up appointment.  If she experiences any problems with hypoglycemia     she is to contact our office immediately.  She will be scheduled for a follow-up    appointment in 1 month for reevaluation.            Pain Plan      Pain Zero Today            Electronically signed by AFSANEH REESE  04/04/2021 20:02       Disclaimer: Converted document may not contain table formatting or lab diagrams. Please see [employer] for the authenticated document.

## 2021-07-08 ENCOUNTER — LAB (OUTPATIENT)
Dept: LAB | Facility: HOSPITAL | Age: 41
End: 2021-07-08

## 2021-07-08 DIAGNOSIS — Z01.818 PRE-OP TESTING: ICD-10-CM

## 2021-07-08 PROCEDURE — C9803 HOPD COVID-19 SPEC COLLECT: HCPCS

## 2021-07-08 PROCEDURE — U0003 INFECTIOUS AGENT DETECTION BY NUCLEIC ACID (DNA OR RNA); SEVERE ACUTE RESPIRATORY SYNDROME CORONAVIRUS 2 (SARS-COV-2) (CORONAVIRUS DISEASE [COVID-19]), AMPLIFIED PROBE TECHNIQUE, MAKING USE OF HIGH THROUGHPUT TECHNOLOGIES AS DESCRIBED BY CMS-2020-01-R: HCPCS

## 2021-07-09 LAB — SARS-COV-2 RNA RESP QL NAA+PROBE: NOT DETECTED
